# Patient Record
Sex: FEMALE | Race: WHITE | URBAN - METROPOLITAN AREA
[De-identification: names, ages, dates, MRNs, and addresses within clinical notes are randomized per-mention and may not be internally consistent; named-entity substitution may affect disease eponyms.]

---

## 2024-07-12 ENCOUNTER — CLINICAL DOCUMENTATION (OUTPATIENT)
Age: 54
End: 2024-07-12

## 2024-07-12 NOTE — PROGRESS NOTES
Records received from Dr Ethan Gonzalez  and per updated office protocol do not meet criteria for further review. Schedule next available for Parenchymal liver disease

## 2024-12-10 ENCOUNTER — OFFICE VISIT (OUTPATIENT)
Age: 54
End: 2024-12-10
Payer: COMMERCIAL

## 2024-12-10 VITALS
BODY MASS INDEX: 28.95 KG/M2 | SYSTOLIC BLOOD PRESSURE: 110 MMHG | HEIGHT: 63 IN | HEART RATE: 73 BPM | WEIGHT: 163.4 LBS | DIASTOLIC BLOOD PRESSURE: 61 MMHG | OXYGEN SATURATION: 99 %

## 2024-12-10 DIAGNOSIS — R93.5 ABNORMAL FINDINGS ON DIAGNOSTIC IMAGING OF ABDOMEN: Primary | ICD-10-CM

## 2024-12-10 PROBLEM — R10.13 EPIGASTRIC PAIN: Status: ACTIVE | Noted: 2022-04-10

## 2024-12-10 PROBLEM — E61.1 IRON DEFICIENCY: Status: ACTIVE | Noted: 2024-12-10

## 2024-12-10 PROBLEM — E55.9 VITAMIN D DEFICIENCY: Status: ACTIVE | Noted: 2024-12-10

## 2024-12-10 PROBLEM — K80.20 BILIARY CALCULUS: Status: ACTIVE | Noted: 2022-04-10

## 2024-12-10 PROBLEM — E04.1 THYROID NODULE: Status: ACTIVE | Noted: 2024-12-10

## 2024-12-10 PROBLEM — G57.63: Status: ACTIVE | Noted: 2024-12-10

## 2024-12-10 PROBLEM — E07.9 DISORDER OF THYROID GLAND: Status: ACTIVE | Noted: 2024-12-10

## 2024-12-10 PROCEDURE — 91200 LIVER ELASTOGRAPHY: CPT | Performed by: NURSE PRACTITIONER

## 2024-12-10 PROCEDURE — 99204 OFFICE O/P NEW MOD 45 MIN: CPT | Performed by: NURSE PRACTITIONER

## 2024-12-10 ASSESSMENT — PATIENT HEALTH QUESTIONNAIRE - PHQ9
1. LITTLE INTEREST OR PLEASURE IN DOING THINGS: NOT AT ALL
SUM OF ALL RESPONSES TO PHQ QUESTIONS 1-9: 0
SUM OF ALL RESPONSES TO PHQ9 QUESTIONS 1 & 2: 0
SUM OF ALL RESPONSES TO PHQ QUESTIONS 1-9: 0
2. FEELING DOWN, DEPRESSED OR HOPELESS: NOT AT ALL
SUM OF ALL RESPONSES TO PHQ QUESTIONS 1-9: 0
SUM OF ALL RESPONSES TO PHQ QUESTIONS 1-9: 0

## 2024-12-10 NOTE — PROGRESS NOTES
[facility] LIVER [facility]     Gideon Pollack MD, FACP, MACG, FAASLD   MD Beba Blackmon, CARTER Juárez, [facility]   Giuliana Begum, [facility]   Vijaya Del Toroosta, [facility]  Rex Castañeda, [facility]   Jessica Mireles, [facility]   Joya Rees, [facility]   [address], [address]   [place]  [address] [phone]   FAX: [phone]  [facility]   [address], [address]   [place]  [address] [phone]   FAX: [phone]           Patient Care Team:  Ethan Gonzalez MD as PCP - General (Family Medicine)      Patient Active Problem List   Diagnosis    Biliary calculus    Epigastric pain    Thyroid nodule    Iron deficiency    Vitamin D deficiency    Stone neuroma of both feet         The clinicians listed above have asked me to see Kristyn Charlton in consultation regarding abnormal abdominal imaging and its management.  All medical records sent by the referring physicians were reviewed including imaging studies..      The patient is a 53 y.o. female who underwent ultrasound in 6/2022 for evaluation of right upper quadrant pain this demonstrated mildly increased hepatic echogenicity.  The patient suspects this pain was musculoskeletal in nature following increased activity during a vacation.    The most recent laboratory studies indicate the liver transaminases are normal.  Alkaline phosphatase is normal.  Liver function is normal.  The platelet count is normal.    Serologic evaluation for markers of chronic liver disease was negative for an elevation in ferritin or iron saturation.    An assessment of liver fibrosis with biopsy or elastography has not been performed.      In the office today the patient has the following symptoms:  The patient feels well and has no liver related

## 2024-12-10 NOTE — PROGRESS NOTES
Patient identified by name and date of birth  Kristyn Charlton is a 53 y.o. female   Chief Complaint   Patient presents with    New Patient     Parenchymal liver disease      Vitals:    12/10/24 0955 12/10/24 1006   BP: (!) 111/44 110/61   Site: Right Upper Arm    Pulse: 73    SpO2: 99%    Weight: 74.1 kg (163 lb 6.4 oz)    Height: 1.6 m (5' 3\")        No LMP recorded. (Menstrual status: Not having periods).      \"Have you been to the ER, urgent care clinic since your last visit?  Hospitalized since your last visit?\"    NO    “Have you seen or consulted any other health care providers outside of [facility] since your last visit?”    Patient is being seen by PCP.

## 2024-12-11 LAB
A1AT SERPL-MCNC: 138 MG/DL (ref 101–187)
ALBUMIN SERPL-MCNC: 4.5 G/DL (ref 3.8–4.9)
ALP SERPL-CCNC: 105 IU/L (ref 44–121)
ALT SERPL-CCNC: 36 IU/L (ref 0–32)
AST SERPL-CCNC: 30 IU/L (ref 0–40)
BASOPHILS # BLD AUTO: 0.1 X10E3/UL (ref 0–0.2)
BASOPHILS NFR BLD AUTO: 1 %
BILIRUB DIRECT SERPL-MCNC: 0.11 MG/DL (ref 0–0.4)
BILIRUB SERPL-MCNC: 0.3 MG/DL (ref 0–1.2)
BUN SERPL-MCNC: 12 MG/DL (ref 6–24)
BUN/CREAT SERPL: 12 (ref 9–23)
CALCIUM SERPL-MCNC: 9.1 MG/DL (ref 8.7–10.2)
CERULOPLASMIN SERPL-MCNC: 26.6 MG/DL (ref 19–39)
CHLORIDE SERPL-SCNC: 105 MMOL/L (ref 96–106)
CO2 SERPL-SCNC: 20 MMOL/L (ref 20–29)
CREAT SERPL-MCNC: 1 MG/DL (ref 0.57–1)
EGFRCR SERPLBLD CKD-EPI 2021: 67 ML/MIN/1.73
EOSINOPHIL # BLD AUTO: 0.2 X10E3/UL (ref 0–0.4)
EOSINOPHIL NFR BLD AUTO: 2 %
ERYTHROCYTE [DISTWIDTH] IN BLOOD BY AUTOMATED COUNT: 12.3 % (ref 11.7–15.4)
GLUCOSE SERPL-MCNC: 63 MG/DL (ref 70–99)
HAV AB SER QL IA: NEGATIVE
HBV CORE AB SERPL QL IA: NEGATIVE
HBV SURFACE AB SER QL: NON REACTIVE
HBV SURFACE AG SERPL QL IA: NEGATIVE
HCT VFR BLD AUTO: 40.2 % (ref 34–46.6)
HCV AB SERPL QL IA: NORMAL
HCV IGG SERPL QL IA: NON REACTIVE
HGB BLD-MCNC: 12.6 G/DL (ref 11.1–15.9)
IMM GRANULOCYTES # BLD AUTO: 0 X10E3/UL (ref 0–0.1)
IMM GRANULOCYTES NFR BLD AUTO: 0 %
LYMPHOCYTES # BLD AUTO: 1.6 X10E3/UL (ref 0.7–3.1)
LYMPHOCYTES NFR BLD AUTO: 25 %
MCH RBC QN AUTO: 30.1 PG (ref 26.6–33)
MCHC RBC AUTO-ENTMCNC: 31.3 G/DL (ref 31.5–35.7)
MCV RBC AUTO: 96 FL (ref 79–97)
MONOCYTES # BLD AUTO: 0.8 X10E3/UL (ref 0.1–0.9)
MONOCYTES NFR BLD AUTO: 12 %
NEUTROPHILS # BLD AUTO: 3.7 X10E3/UL (ref 1.4–7)
NEUTROPHILS NFR BLD AUTO: 60 %
PLATELET # BLD AUTO: 251 X10E3/UL (ref 150–450)
POTASSIUM SERPL-SCNC: 4.3 MMOL/L (ref 3.5–5.2)
PROT SERPL-MCNC: 6.5 G/DL (ref 6–8.5)
RBC # BLD AUTO: 4.19 X10E6/UL (ref 3.77–5.28)
SODIUM SERPL-SCNC: 142 MMOL/L (ref 134–144)
WBC # BLD AUTO: 6.2 X10E3/UL (ref 3.4–10.8)

## 2024-12-12 LAB
MITOCHONDRIA M2 IGG SER-ACNC: <20 UNITS (ref 0–20)
SMA IGG SER-ACNC: 7 UNITS (ref 0–19)

## 2024-12-17 ENCOUNTER — TELEPHONE (OUTPATIENT)
Age: 54
End: 2024-12-17

## 2024-12-17 NOTE — TELEPHONE ENCOUNTER
----- Message from JANE Marley NP sent at 12/13/2024  3:49 PM EST -----  Please let Ms. Zoltan know that all of her liver labs look good and that screening tests for common causes of liver disease were negative.  She does not need to see us for any additional follow-up.  Labs did indicate that she does not have protective immunity for hepatitis A or B so I would recommend that she discuss potential vaccination with Dr. Gonzalez.  Thanks!    12/17/24 0842: Above msg relayed to pt. Pt verbalized understanding. SQ